# Patient Record
Sex: MALE | URBAN - METROPOLITAN AREA
[De-identification: names, ages, dates, MRNs, and addresses within clinical notes are randomized per-mention and may not be internally consistent; named-entity substitution may affect disease eponyms.]

---

## 2019-09-17 ENCOUNTER — APPOINTMENT (OUTPATIENT)
Dept: URBAN - METROPOLITAN AREA CLINIC 319 | Age: 44
Setting detail: DERMATOLOGY
End: 2019-09-17

## 2019-09-17 DIAGNOSIS — L21.8 OTHER SEBORRHEIC DERMATITIS: ICD-10-CM

## 2019-09-17 DIAGNOSIS — L72.8 OTHER FOLLICULAR CYSTS OF THE SKIN AND SUBCUTANEOUS TISSUE: ICD-10-CM

## 2019-09-17 PROCEDURE — OTHER TREATMENT REGIMEN: OTHER

## 2019-09-17 PROCEDURE — 10060 I&D ABSCESS SIMPLE/SINGLE: CPT

## 2019-09-17 PROCEDURE — OTHER MIPS QUALITY: OTHER

## 2019-09-17 PROCEDURE — OTHER ORDER TESTS: OTHER

## 2019-09-17 PROCEDURE — OTHER PRESCRIPTION: OTHER

## 2019-09-17 PROCEDURE — OTHER INCISION AND DRAINAGE: OTHER

## 2019-09-17 PROCEDURE — 99202 OFFICE O/P NEW SF 15 MIN: CPT | Mod: 25

## 2019-09-17 PROCEDURE — OTHER INTRALESIONAL KENALOG: OTHER

## 2019-09-17 PROCEDURE — 11900 INJECT SKIN LESIONS </W 7: CPT

## 2019-09-17 PROCEDURE — OTHER COUNSELING: OTHER

## 2019-09-17 RX ORDER — KETOCONAZOLE 20.5 MG/ML
SHAMPOO, SUSPENSION TOPICAL BIW
Qty: 3 | Refills: 3 | Status: ERX | COMMUNITY
Start: 2019-09-17

## 2019-09-17 RX ORDER — DOXYCYCLINE HYCLATE 50 MG/1
TABLET, FILM COATED ORAL
Qty: 60 | Refills: 0 | Status: ERX | COMMUNITY
Start: 2019-09-17

## 2019-09-17 RX ORDER — MOMETASONE FUROATE 1 MG/G
OINTMENT TOPICAL
Qty: 1 | Refills: 3 | Status: ERX | COMMUNITY
Start: 2019-09-17

## 2019-09-17 RX ORDER — CLOBETASOL PROPIONATE 0.46 MG/ML
SOLUTION TOPICAL
Qty: 3 | Refills: 2 | Status: ERX | COMMUNITY
Start: 2019-09-17

## 2019-09-17 ASSESSMENT — LOCATION ZONE DERM: LOCATION ZONE: SCALP

## 2019-09-17 ASSESSMENT — LOCATION SIMPLE DESCRIPTION DERM: LOCATION SIMPLE: LEFT SCALP

## 2019-09-17 ASSESSMENT — LOCATION DETAILED DESCRIPTION DERM: LOCATION DETAILED: LEFT CENTRAL FRONTAL SCALP

## 2019-09-17 NOTE — PROCEDURE: TREATMENT REGIMEN
Detail Level: Detailed
Plan: Keep area clean with warm soap and water\\n\\nApply warm compresses to the area 2-3 times per day and allow for additional drainage, as warmth will loosen up material not expressed during the incision and drainage procedure.  If the lesion was too firm to be drained, applying warmth should aide in softening the material making drainage at your follow up visit easier and expedite the healing process.  If the lesion softens and drains on its own, allow it to do so, but do not manipulate the lesion as this can cause additional inflammation. \\n\\nWarm compresses can be made by wetting a washcloth, wringing it out and placing it in a microwave for 30 seconds.  Be careful to not burn yourself in the process, the cloth will be hot. \\n\\nIt is OK to allow clean warm water to run over the area (ex. shower), however do not submerge the area if open, such as in a bathtub, pool, ocean or other body of water. \\n\\nIf the lesion has been opened, keep covered with a clean, loose dressing to absorb any additional drainage that may occur.  Be careful not to apply this bandage too tight as this can occlude the lesion and inhibit drainage.  \\n\\nIf the lesion has packing placed, remove approx 1-2cm daily.  Packing is placed primarily to keep the incision site open to allow drainage, and to delay any premature closure which can slow healing.  If this packing is accidentally pulled out, no need to worry, but do NOT attempt to re-insert packing into wound.\\n\\nIf prescribed, take antibiotics as directed.  Many inflamed cysts are not infected and do NOT require antibiotics.

## 2019-09-17 NOTE — HPI: RASH
Is This A New Presentation, Or A Follow-Up?: Rash
Additional History: Patient has been kindly referred to our office by Dr. Nobles

## 2019-09-17 NOTE — PROCEDURE: INCISION AND DRAINAGE
Epidermal Closure: simple interrupted
Dressing: dry sterile dressing
Drainage Amount?: minimal
Consent was obtained and risks were reviewed including but not limited to delayed wound healing, infection, need for multiple I and D's, and pain.
Wound Care: Mupirocin
Anesthesia Type: 1% lidocaine without epinephrine
Curette Text (Optional): After the contents were expressed a curette was used to partially remove the cyst wall.
Anesthesia Volume In Cc: 0.5
Lesion Type: Cyst
Render Postcare In Note?: No
Preparation Text: The area was prepped in the usual clean fashion.
Drainage Type?: purulent  and cyst-like
Method: 11 blade
Detail Level: Detailed
Epidermal Sutures: 4-0 Ethilon
Size Of Lesion In Cm (Optional But May Be Required For Some Insurances): 0
Post-Care Instructions: I reviewed with the patient in detail post-care instructions. Patient should keep wound covered and call the office should any redness, pain, swelling or worsening occur.

## 2019-09-17 NOTE — PROCEDURE: INTRALESIONAL KENALOG
NOE SEDRICK  : 1949  10/24/18 14:39:19  ACCOUNT:  [id number]  HOME PHONE:  [phone]  WORK PHONE:       Lab results from 10/23/18:    WBC: 5.2  RBC: 4.1  Hgb: 13.1  Plt: 199    Labs are normal. Patient emailed results.     Ida Rodriguez RN BSN        Administered By (Optional): Tony Crenshaw PA-C